# Patient Record
Sex: FEMALE | Race: WHITE
[De-identification: names, ages, dates, MRNs, and addresses within clinical notes are randomized per-mention and may not be internally consistent; named-entity substitution may affect disease eponyms.]

---

## 2017-01-06 ENCOUNTER — HOSPITAL ENCOUNTER (INPATIENT)
Dept: HOSPITAL 45 - C.OPB | Age: 32
LOS: 1 days | Discharge: HOME | End: 2017-01-07
Attending: OBSTETRICS & GYNECOLOGY | Admitting: OBSTETRICS & GYNECOLOGY
Payer: COMMERCIAL

## 2017-01-06 VITALS
WEIGHT: 150.36 LBS | BODY MASS INDEX: 25.05 KG/M2 | HEIGHT: 65 IN | BODY MASS INDEX: 25.05 KG/M2 | HEIGHT: 65 IN | WEIGHT: 150.36 LBS

## 2017-01-06 VITALS — DIASTOLIC BLOOD PRESSURE: 87 MMHG | TEMPERATURE: 98.06 F | HEART RATE: 103 BPM | SYSTOLIC BLOOD PRESSURE: 130 MMHG

## 2017-01-06 VITALS — SYSTOLIC BLOOD PRESSURE: 102 MMHG | HEART RATE: 56 BPM | DIASTOLIC BLOOD PRESSURE: 60 MMHG | TEMPERATURE: 98.24 F

## 2017-01-06 VITALS — HEART RATE: 88 BPM | TEMPERATURE: 98.06 F | DIASTOLIC BLOOD PRESSURE: 74 MMHG | SYSTOLIC BLOOD PRESSURE: 115 MMHG

## 2017-01-06 DIAGNOSIS — Z3A.39: ICD-10-CM

## 2017-01-06 LAB
EOSINOPHIL NFR BLD AUTO: 212 K/UL (ref 130–400)
HCT VFR BLD CALC: 36.7 % (ref 37–47)
MCH RBC QN AUTO: 29.8 PG (ref 25–34)
MCHC RBC AUTO-ENTMCNC: 33.8 G/DL (ref 32–36)
MCV RBC AUTO: 88.2 FL (ref 80–100)
PMV BLD AUTO: 10.2 FL (ref 7.4–10.4)
RBC # BLD AUTO: 4.16 M/UL (ref 4.2–5.4)
WBC # BLD AUTO: 10.1 K/UL (ref 4.8–10.8)

## 2017-01-06 PROCEDURE — 0KQM3ZZ REPAIR PERINEUM MUSCLE, PERCUTANEOUS APPROACH: ICD-10-PCS | Performed by: OBSTETRICS & GYNECOLOGY

## 2017-01-06 RX ADMIN — Medication PRN MG: at 09:55

## 2017-01-06 RX ADMIN — Medication PRN MG: at 18:21

## 2017-01-06 RX ADMIN — DOCUSATE SODIUM SCH MG: 100 CAPSULE, LIQUID FILLED ORAL at 19:35

## 2017-01-06 NOTE — DELIVERY SUMMARY
DATE OF OPERATION:  01/06/2017

 

TIME OF DELIVERY OF BABY:  0758.

 

TIME OF DELIVERY OF PLACENTA:  0821.

 

DETAILS OF DELIVERY:  The patient was found to be fully dilated and desired

to push.  She pushed for about 10 minutes and delivered the head without

difficulty.  Shoulders were delivered with minimum traction.  Baby was handed

off to the mother where mouth and nose were suctioned.  Cord was clamped x2

and cut, it was a 3-vessel cord, and then cord blood was obtained.  Perineum

and vagina were checked for lacerations.  There was a perineal laceration in

the posterior fourchette and rectal exam was done and confirmed to be a third

degree. Good sphincter tone was noted.

The gloves were changed.

then external fibers of the sphincter were held with

Allis clamps and then these fibers were reapproximated with

2-0 Vicryl with figure-of-eight stitch in end-to-end fashion. Then rectal

exam was repeated and no sutures were felt and good sphincter tone noted.  
Gloves

were changed.  Vaginal mucosa and perineal muscles were reapproximated with a

different 2-0 Vicryl in a running locked fashion, skin in a subcuticular

fashion.  Good hemostasis was achieved.  The rest of the vagina and perineum

were intact.  Then, placenta was found to be in the vagina, delivered

spontaneously, intact and complete.  Uterus was explored, found to be empty. 

Lower segment cleared off all clots and debris.  Fundus was firm.  EBL was

300.  Baby and mother tolerated the procedure well.  Sponge, lap, needle and

instrument counts were correct x2.  Baby was a viable female infant.  Apgars

were 9/9.  Weight is pending.  The patient was given 600 mg of clindamycin

during third-degree repair.  No complications happened and I was present

during the whole procedure.

 

 

I attest to the content of the Intraoperative Record and any orders documented 
therein. Any exceptions are noted below.

 

 

 

MTDD

## 2017-01-07 VITALS
OXYGEN SATURATION: 99 % | DIASTOLIC BLOOD PRESSURE: 66 MMHG | TEMPERATURE: 98.6 F | SYSTOLIC BLOOD PRESSURE: 103 MMHG | HEART RATE: 97 BPM

## 2017-01-07 VITALS — DIASTOLIC BLOOD PRESSURE: 81 MMHG | SYSTOLIC BLOOD PRESSURE: 118 MMHG | HEART RATE: 84 BPM | TEMPERATURE: 98.42 F

## 2017-01-07 VITALS — HEART RATE: 80 BPM | TEMPERATURE: 98.6 F | DIASTOLIC BLOOD PRESSURE: 72 MMHG | SYSTOLIC BLOOD PRESSURE: 107 MMHG

## 2017-01-07 VITALS
SYSTOLIC BLOOD PRESSURE: 117 MMHG | HEART RATE: 90 BPM | DIASTOLIC BLOOD PRESSURE: 76 MMHG | TEMPERATURE: 98.42 F | OXYGEN SATURATION: 97 %

## 2017-01-07 VITALS — OXYGEN SATURATION: 99 %

## 2017-01-07 VITALS — DIASTOLIC BLOOD PRESSURE: 81 MMHG | TEMPERATURE: 98.42 F | HEART RATE: 84 BPM

## 2017-01-07 LAB — HCT VFR BLD CALC: 32 % (ref 37–47)

## 2017-01-07 RX ADMIN — Medication PRN MG: at 15:29

## 2017-01-07 RX ADMIN — DOCUSATE SODIUM SCH MG: 100 CAPSULE, LIQUID FILLED ORAL at 07:29

## 2017-01-07 RX ADMIN — Medication PRN MG: at 07:30

## 2017-01-07 NOTE — OB/GYN PROGRESS NOTE
OB/GYN Progress Note


Date of Service


Jan 7, 2017.





Subjective


conversation w/ patient, physical exam


Ambulation:  ambulating normally


Voiding:  no voiding problems


Passing Gas:  Yes


Diet Tolerance:  Regular Diet


Lochia:  Moderate


Feeding Type:  Breast Feeding





Review of Systems


Constitutional:  No chills, No fatigue, No fever, No problem reported, No sweats

, No weakness, No weight loss


Respiratory:  No cough, No dyspnea at rest, No dyspnea on exertion, No 

hemoptysis, No problem reported, No shortness of breath, No sputum, No wheezing


Cardiac:  No PND, No chest pain, No claudication, No edema, No orthopnea, No 

palpitations, No problem reported


Breast:  No breast lump, No breast pain, No change in shape, No nipple discharge

, No problem reported, No see HPI


Abdomen:  No GI bleeding, No constipation, No diarrhea, No nausea, No pain, No 

problem reported, No vomiting


Female :  No abnormal vaginal bleeding, No dysuria, No hematuria, No 

incontinence, No problem reported, No see HPI, No urinary frequency, No vaginal 

discharge


Vd Day #1


pt doing well


no complaints


anticipate disch tomorrow


paperwork done for disch tomorrow





Objective


Vital Signs











  Date Time  Temp Pulse Resp B/P Pulse Ox O2 Delivery O2 Flow Rate FiO2


 


1/7/17 07:30 37.0 80 20 107/72  Room Air  


 


1/7/17 07:20      Room Air  


 


1/7/17 04:30 37.0 97 18 103/66 99 Room Air  


 


1/7/17 00:10     97 Room Air  


 


1/7/17 00:10 36.9 90 18 117/76 97 Room Air  


 


1/6/17 20:01 36.7 88 18 115/74  Room Air  


 


1/6/17 15:15 36.8 56 16 102/60  Room Air  


 


1/6/17 15:15      Room Air  


 


1/6/17 11:50 36.7 103 18 130/87  Room Air  


 


1/6/17 11:50      Room Air  











Laboratory Results





Last 24 Hours








Test


  1/7/17


05:57


 


Hemoglobin 10.6 g/dL 


 


Hematocrit 32.0 %

## 2017-01-07 NOTE — DISCHARGE INSTRUCTIONS
Discharge Instructions


Admission


Reason for Admission:  LABOR





Discharge


Discharge Diagnosis / Problem:  postpartum





Discharge Goals


Goal(s):  Routine recovery after delivery





Activity Recommendations


Activity Limitations:  as noted below


Lifting Limitations:  gradually increase as tolerated


Exercise/Sports Limitations:  until after follow-up appointment


May Resume Sexual Activity:  after follow-up appointment


Driving or Machine Use:  





ACTIVITY RECOMMENDATIONS:





* Gradual return to full activity over the next 2-3 weeks.


* No lifting - nothing heavier than baby over the next 2-3 weeks.


* Do not engage in vigorous exercise, sexual activity or sports until cleared 

by 


   your physician.


* Do not drive or operate any motorized equipment until cleared by your 

physician.


* You may shower/bathe daily.








BREAST CARE:





If you are not breast feeding:





*  Wear a supportive bra 24 hours a day for one to two weeks.


*  Avoid stimulating your breasts and nipples as much as possible during the


    first few weeks after delivery.


*  When taking a shower, have the warm water hit your back, not breasts.


*  When your breasts feel full, apply ice packs.  Usually three to four times 


    a day helps ease the discomfort.


*  Take a mild pain medication (Tylenol/Motrin) when you are uncomfortable.





If breast feeding:





*  Use breast milk to lubricate nipples.  Lansinoh cream may be used for sore 

nipples. 


    You do not need to remove cream prior to breast feeding.  If using a 

different 


    brand of cream, check the label for directions regarding removal of cream 

prior 


    to nursing.


*  Wear a supportive bra.


*  If having problems with breasts or breast feeding, call a lactation 

consultant or 


    your health care provider.








EPISIOTOMY CARE:





After delivery, if you have an episiotomy (stitches), the following steps will 


ease discomfort and aid healing.





*   For the first 24 hours after delivery, place ice packs next to your 

episiotomy 


    to help reduce swelling.


*  After the first 24 hour-period, sitz baths, either portable or in the tub, 

are 


    suggested.  A shower with a shower arm sprayed over the episiotomy may 


    be comforting.


*  Elvi care should be done after each voiding and bowel movement.  Squirt 


    warm water from a plastic bottle over the perineum (region of the body 


    between the anus and urinary opening) and pat dry.


*  Use Dermoplast to ease discomfort.  Shake container.  Spray directly over


    the episiotomy.  


*  Place a Tucks on a clean sanitary pad next to your episiotomy.








OVER THE COUNTER MEDICATION:





*  For discomfort or pain, you may use Acetaminophen (Tylenol), Ibuprofen (Advil

), or 


    Naproxen (Aleve) following the package directions. 


*  For constipation you may use Colace following the package directions.








SPECIAL CARE INSTRUCTIONS:





When you are discharged from the hospital, it is important for you to follow 


the instructions listed below:





*  During the first week at home, you should be able to care for yourself and


    your baby.  In addition, the usual light household activities are 

encouraged.





*  Limit your activities to the way you feel.  Do not try to clean the house or 


    move furniture.  Be sensible.





*  If you actively engage in sports and have done so up until the time of your


   delivery, you may resume these activities as soon as you feel able.  This may


   take up to one month or even longer.  Use good judgment.





*  Continue to take your prenatal vitamins for at least six weeks after the 

birth


    of your baby.





*  Your diet need not be limited unless you were on a special diet before your 


    delivery.  Breast-feeding mothers need around 2500 calories per day and at


    least 64-80 ounces of fluid per day (8 to 10 glasses).





*  You should eat foods from the four major food groups.  Crash diets or fad 

diets


    are to be avoided.  Eating lean meats, fresh fruits and vegetables, low-fat 


    dairy products, high fiber foods and a regular exercise program, will help 

you 


    get back to your pre-pregnancy weight without putting your health at risk.





*  Constipation is sometimes a problem after delivery.  Take a mild laxative as 


    needed.  If breast feeding, Milk of Magnesia is acceptable to use. You may 


    use a suppository or Fleets enema if no episiotomy.





*  A daily shower or tub bath is suggested.  Be sure to thoroughly and gently 


    dry the perineum.





*  A bloody vaginal discharge will usually continue until around four weeks post


    partum.  A small amount of bleeding may continue for as long as six weeks.  


    Vaginal discharge changes from the bright red bleeding after delivery to 

pink 


    then brownish and finally yellowish-pink before becoming white and 

disappearing.





*  Bleeding may increase with activity.  Your first period may come in 4-8 

weeks.


    If you are breast feeding, your period may be delayed even longer.





*  [place] (sex) can begin whenever both you and your partner feel 

comfortable


    and do not have any form of genital infection.  It is recommended that you 

wait


    until after your return postpartum appointment and discuss with your 

physician.


    If you have questions, please talk to your health care practitioner.  A 

condom 


    should be used to prevent infection and pregnancy.





*  Foreplay, gentle intercourse and lubrication is very important the first 

several 


    times to prevent pain.  A water-based lubricant such as K-Y jelly or 

Astroglide 


    may be used.





*  Tampons may be used six weeks after delivery.





*  Douching should be avoided for 6 weeks after delivery.





*  If you have RH negative blood and your baby is RH positive, you will receive 


    RHOGAM by injection prior to discharge.  The nurse will give you a card to 


    keep with you that has the date and place that you received RHOGAM after 


    delivery.





*  During your prenatal care, you had a Rubella screen done to check for the 


    presence of rubella antibodies in your blood.  If your test was negative, 

you


    will receive a Rubella vaccine prior to discharge.  This vaccine may cause 

a 


    fever, soreness at the injection site and flu-like symptoms.  If these 

symptoms


    persist, notify your health care practitioner.   Pregnancy is not advised 

for 


    three months after a Rubella vaccine.  There is a higher chance of having a 


    baby with birth defects if conceived within three months of getting the 

vaccine.





*  If you were discharged 24 hours from delivery or before 48 hours: Visiting 

nurses 


    will come to your home 48 hours after discharge to assess you and your 

baby.  


    The visiting nurse will meet with you while you are in the hospital to 

arrange a 


    time and get directions to your home.





*  Verbalizes understanding of car seat law as reviewed with patient nursing.





*  Car Seat hand-out given and reviewed with patient by nursing.





*  Shaken baby information reviewed with patient by nursing.


 





Call you doctor if:





*  Heavy bleeding (saturating several pads an hour) or passing clots the size 

of 


    your fist.


*  A fever >101 degrees F (38.3 degrees C) on two occasions four hours apart 


    and/or chills.


*  Unusual pain in the pelvic or vaginal areas.


*  "Baby Blues" lasting longer than two weeks.





If you have any questions or concerns, call your health care practitioner 


at [phone].








FOLLOW-UP VISIT:





*  Please call the office at [phone] to schedule a 6 week postpartum


    examination.  It is important you keep this appointment.  


*  It is important for you to make arrangements for either yearly or twice 


    yearly check-ups thereafter.











.





Current Hospital Diet


Patient's current hospital diet: Regular OB Diet





Discharge Diet


Recommended Diet:  Regular Diet





Pending Studies


Studies pending at discharge:  no





Medical Emergencies








.


Who to Call and When:





Medical Emergencies:  If at any time you feel your situation is an emergency, 

please call 911 immediately.





.





Non-Emergent Contact


Non-Emergency issues call your:  Specialist


Call Non-Emergent contact if:  you have a fever, your pain is not controlled, 

wound has increased drainage





.


.





"Provider Documentation" section prepared by Richard Emanuel.





VTE Core Measure


Inpt VTE Proph given/why not?:  Treatment not indicated

## 2017-04-18 ENCOUNTER — HOSPITAL ENCOUNTER (EMERGENCY)
Dept: HOSPITAL 45 - C.EDB | Age: 32
LOS: 1 days | Discharge: HOME | End: 2017-04-19
Payer: COMMERCIAL

## 2017-04-18 VITALS
WEIGHT: 129.85 LBS | WEIGHT: 129.85 LBS | HEIGHT: 65 IN | HEIGHT: 65 IN | BODY MASS INDEX: 21.63 KG/M2 | BODY MASS INDEX: 21.63 KG/M2

## 2017-04-18 VITALS — TEMPERATURE: 97.88 F

## 2017-04-18 DIAGNOSIS — N13.30: ICD-10-CM

## 2017-04-18 DIAGNOSIS — M54.5: ICD-10-CM

## 2017-04-18 DIAGNOSIS — R10.2: Primary | ICD-10-CM

## 2017-04-18 LAB
APPEARANCE UR: CLEAR
BILIRUB UR-MCNC: (no result) MG/DL
COLOR UR: YELLOW
EOSINOPHIL NFR BLD AUTO: 229 K/UL (ref 130–400)
HCT VFR BLD CALC: 41.1 % (ref 37–47)
MCH RBC QN AUTO: 29 PG (ref 25–34)
MCHC RBC AUTO-ENTMCNC: 33.3 G/DL (ref 32–36)
MCV RBC AUTO: 86.9 FL (ref 80–100)
NITRITE UR QL STRIP: (no result)
PH UR STRIP: 6.5 [PH] (ref 4.5–7.5)
PMV BLD AUTO: 9.6 FL (ref 7.4–10.4)
RBC # BLD AUTO: 4.73 M/UL (ref 4.2–5.4)
SP GR UR STRIP: 1.01 (ref 1–1.03)
URINE BILL WITH OR WITHOUT MIC: (no result)
URINE EPITHELIAL CELL AUTO: (no result) /LPF (ref 0–5)
UROBILINOGEN UR-MCNC: (no result) MG/DL
WBC # BLD AUTO: 6.13 K/UL (ref 4.8–10.8)

## 2017-04-19 VITALS — HEART RATE: 73 BPM | SYSTOLIC BLOOD PRESSURE: 119 MMHG | OXYGEN SATURATION: 98 % | DIASTOLIC BLOOD PRESSURE: 87 MMHG

## 2017-04-19 LAB
ALP SERPL-CCNC: 122 U/L (ref 45–117)
ALT SERPL-CCNC: 35 U/L (ref 12–78)
ANION GAP SERPL CALC-SCNC: 6 MMOL/L (ref 3–11)
AST SERPL-CCNC: 19 U/L (ref 15–37)
BASOPHILS # BLD: 0.03 K/UL (ref 0–0.2)
BASOPHILS NFR BLD: 0.5 %
BUN SERPL-MCNC: 17 MG/DL (ref 7–18)
BUN/CREAT SERPL: 22 (ref 10–20)
CALCIUM SERPL-MCNC: 8.9 MG/DL (ref 8.5–10.1)
CHLORIDE SERPL-SCNC: 107 MMOL/L (ref 98–107)
CO2 SERPL-SCNC: 30 MMOL/L (ref 21–32)
COMPLETE: YES
CREAT CL PREDICTED SERPL C-G-VRATE: 95.8 ML/MIN
CREAT SERPL-MCNC: 0.78 MG/DL (ref 0.6–1.2)
GLUCOSE SERPL-MCNC: 94 MG/DL (ref 70–99)
IG%: 0.2 %
IMM GRANULOCYTES NFR BLD AUTO: 52.4 %
LYMPHOCYTES # BLD: 3.21 K/UL (ref 1.2–3.4)
MANUAL MICROSCOPIC REQUIRED?: NO
MONOCYTES NFR BLD: 8.2 %
NEUTROPHILS # BLD AUTO: 5.1 %
NEUTROPHILS NFR BLD AUTO: 33.6 %
POTASSIUM SERPL-SCNC: 3.7 MMOL/L (ref 3.5–5.1)
PREG INTERNAL NEGATIVE QC: (no result)
PREG INTERNAL POSITIVE QC: (no result)
REVIEW REQ?: NO
SODIUM SERPL-SCNC: 143 MMOL/L (ref 136–145)

## 2017-04-19 NOTE — DIAGNOSTIC IMAGING REPORT
RENAL ULTRASOUND



HISTORY:      pelvic, back pain, pain with urination



COMPARISON:  None.



FINDINGS:



Right kidney: 11.1 . Mild fullness within the right renal collecting system

without cyril hydronephrosis. Normal corticomedullary differentiation and

cortical thickness.



Left kidney: 11.2 No hydronephrosis. Normal corticomedullary differentiation and

cortical thickness.



Bladder: No bladder wall thickening. The bilateral ureteral jets were

identified.



Liver is borderline enlarged measuring 18.6 cm in length.



IMPRESSION:  



1. Mild fullness within the right renal collecting system without cyril

hydronephrosis.

2. Normal left kidney. 







Electronically signed by:  Daniel Jeffrey M.D.

4/19/2017 7:44 AM



Dictated Date/Time:  4/19/2017 7:41 AM

## 2017-04-19 NOTE — DIAGNOSTIC IMAGING REPORT
EXAMINATION: PELVIC ULTRASOUND (transabdominal and endovaginal scanning)



CLINICAL HISTORY: Pelvic pain, nausea, vomiting, diarrhea.    



COMPARISON STUDY:  June 2008



FINDINGS: 

The uterus measured 7.7 x 3 x 5 cm..

The endometrial stripe measured 3 mm.

The right ovary measured 34 x 17 x 17 mm.

The left ovary measured 32 x 15 x 19 mm.

There is no ultrasonographic evidence of ovarian torsion. It should be noted

that ovarian torsion can be present with normal Doppler ultrasonographic

findings.

There is trace free fluid, likely physiologic.



IMPRESSION:  Minimal free fluid likely physiologic. Otherwise unremarkable

pelvic ultrasound.







Electronically signed by:  Rasheed Quiroz M.D.

4/19/2017 6:43 AM



Dictated Date/Time:  4/19/2017 6:41 AM

## 2017-04-19 NOTE — EMERGENCY ROOM VISIT NOTE
ED Visit Note


First contact with patient:  23:05


Chief Complaint: Abdominal pain.





History of Present Illness: Ms. Bhat is a 31 year-old white female who 

ambulates into the ED accompanied by her baby child complaining of lower 

abdominal pain, pain with urination, lower back pain with urination, fatigue 

and intermittent nausea. 


Historically patient reports she is  4 para 2 and postpartum 3 months.


Patient reports a gradual onset of lower abdominal pain that started 

approximately one month ago.  Since that time her pain has been constant but 

waxing and waning in intensity.  This was associated with a full urination, 

lower back pain.  She describes her abdominal pain as a crampy sensation in her 

back pain as an achy sensation.  She rates her current discomfort 3/10.  Her 

pain worsens with urination.  She has not identified any other aggravating 

factors.  She has been intermittently using Tylenol with mild relief of her 

discomfort.  Associated she has noted urinary burning and increased urinary 

frequency.  Additionally she reports that she has been having intermittent 

sensations of chills and nausea but has not had no cyril fevers or vomiting.  

Additionally she does report that when her discomfort escalates she does get 

lightheaded but has not had a syncopal episode.


She does report approximately one week ago she was seen by her OB/GYN physician 

who did a pelvic examination and reported everything as "normal", but 

encouraged the patient to call back in a week's time if the symptoms have not 

resolved for possible ultrasound of her pelvis and kidneys.  


Additionally she reports she has been feeling fatigued but does report she has 

been taking care of 3 children.


Patient denies fevers, chills, sweats, skin eruptions, skin color changes, 

upper respiratory tract symptoms, shortness of breath, chest pain, diarrhea, 

constipation, rectal bleeding, black/tarry stools, hematuria, vaginal bleeding, 

vaginal discharge.





Review of Systems: As noted above in history of present illness. All body 

systems were reviewed and found to be negative as noted above.





Past Medical History: Ovarian cyst rupture, unspecified bony fracture and 

unspecified skin disorder.


Current Medications: Ibuprofen, prenatal vitamins.


Allergies to Medications: Bactrim.


Social History: Patient is not employed; she feels safe in her home environment

; she denies tobacco and alcohol use.





Physical Examination:


Vital Signs: 








  Date Time  Temp Pulse Resp B/P Pulse Ox O2 Delivery O2 Flow Rate FiO2


 


17 22:30 36.6 73 19 125/84 99   





GENERAL: 31-year-old female in mild distress due to pain, nontoxic-appearing, 

afebrile and hemodynamically stable.


NEUROLOGICAL: Awake, alert and oriented to person, place and time.  Answering 

questions appropriately and following commands.  Normal gait.  Good hand eye 

coordination.  


SKIN: Warm, dry and pink.  No soft tissue eruptions or trauma noted.


HEENT:  Atraumatic and normocephalic.  PERRLA.  Sclera white and conjunctiva 

pink.  Oral cavity moist and pink.  Pharynx is nonerythematous or edematous.  

Speech normal.  No lymphadenopathy.  Trachea midline.  No jugular venous 

distention.


BACK:  No tenderness over the bony spine.  No tenderness throughout the 

paraspinous muscles.  Full range of motion at the waist.  No CVA tenderness.  


THORAX:  Lungs sounds are clear to auscultation and equal bilaterally with 

symmetrical chest wall.  No wheezing, rales or rhonchi.  No crepitus, tenderness

, subcutaneous air or deformities noted.


HEART:  Regular rate and rhythm.  No gallops, rubs or murmurs are appreciated.


ABDOMEN: Flat and soft with mild tenderness throughout the lower abdomen.  

Positive bowel sounds in all quadrants.  No guarding, rigidity or organomegaly.


EXTREMITIES:  Moves all extremities well on command and with purpose.  All 

distal neurovascular statuses are intact and equal bilaterally.  





ED Course:


Patient is assessed as noted above.


Laboratory Testing:








Test


  17


22:40 17


23:40 Range/Units


 


 


Urine Color YELLOW   


 


Urine Appearance CLEAR  CLEAR  


 


Urine pH 6.5  4.5-7.5  


 


Urine Specific Gravity 1.007  1.000-1.030  


 


Urine Protein NEG  NEG  


 


Urine Glucose (UA) NEG  NEG  


 


Urine Ketones NEG  NEG  


 


Urine Occult Blood NEG  NEG  


 


Urine Nitrite NEG  NEG  


 


Urine Bilirubin NEG  NEG  


 


Urine Urobilinogen NEG  NEG  


 


Urine Leukocyte Esterase TRACE  NEG  


 


Urine WBC (Auto) 1-5  0-5  /hpf


 


Urine RBC (Auto) 0-4  0-4  /hpf


 


Urine Hyaline Casts (Auto) 0  0-5  /lpf


 


Urine Epithelial Cells (Auto) 20-30  0-5  /lpf


 


Urine Bacteria (Auto) NEG  NEG  


 


White Blood Count  6.13 4.8-10.8  K/uL


 


Red Blood Count  4.73 4.2-5.4  M/uL


 


Hemoglobin  13.7 12.0-16.0  g/dL


 


Hematocrit  41.1 37-47  %


 


Mean Corpuscular Volume  86.9   fL


 


Mean Corpuscular Hemoglobin  29.0 25-34  pg


 


Mean Corpuscular Hemoglobin


Concent 


  33.3


  32-36  g/dl


 


 


Platelet Count  229 130-400  K/uL


 


Mean Platelet Volume  9.6 7.4-10.4  fL


 


Neutrophils (%) (Auto)  33.6  %


 


Lymphocytes (%) (Auto)  52.4  %


 


Monocytes (%) (Auto)  8.2  %


 


Eosinophils (%) (Auto)  5.1  %


 


Basophils (%) (Auto)  0.5  %


 


Neutrophils # (Auto)  2.07 1.4-6.5  K/uL


 


Lymphocytes # (Auto)  3.21 1.2-3.4  K/uL


 


Monocytes # (Auto)  0.50 0.11-0.59  K/uL


 


Eosinophils # (Auto)  0.31 0-0.5  K/uL


 


Basophils # (Auto)  0.03 0-0.2  K/uL


 


RDW Standard Deviation  43.3 36.4-46.3  fL


 


RDW Coefficient of Variation  13.6 11.5-14.5  %


 


Immature Granulocyte % (Auto)  0.2  %


 


Immature Granulocyte # (Auto)  0.01 0.00-0.02  K/uL


 


Red Blood Cell Morphology  Unremarkable  


 


Sodium Level  143 136-145  mmol/L


 


Potassium Level  3.7 3.5-5.1  mmol/L


 


Chloride Level  107   mmol/L


 


Carbon Dioxide Level  30 21-32  mmol/L


 


Anion Gap  6.0 3-11  mmol/L


 


Blood Urea Nitrogen  17 7-18  mg/dl


 


Creatinine


  


  0.78


  0.60-1.20


mg/dl


 


Est Creatinine Clear Calc


Drug Dose 


  95.8


   ml/min


 


 


Estimated GFR (


American) 


  119.1


   


 


 


Estimated GFR (Non-


American 


  102.7


   


 


 


BUN/Creatinine Ratio  22.0 10-20  


 


Random Glucose  94 70-99  mg/dl


 


Calcium Level  8.9 8.5-10.1  mg/dl


 


Total Bilirubin  0.2 0.2-1  mg/dl


 


Direct Bilirubin  < 0.1 0-0.2  mg/dl


 


Aspartate Amino Transf


(AST/SGOT) 


  19


  15-37  U/L


 


 


Alanine Aminotransferase


(ALT/SGPT) 


  35


  12-78  U/L


 


 


Alkaline Phosphatase  122   U/L


 


Total Protein  8.2 6.4-8.2  gm/dl


 


Albumin  3.9 3.4-5.0  gm/dl


 


Lipase  215   U/L


 


Human Chorionic Gonadotropin,


Qual 


  NEG


  NEG  


 





Pelvic Ultrasound: Was reviewed by myself and read by the radiologist and 

showing no endometrial thickening, normal ovarian follicles, suspected 

nabothian cyst, blood flow seen to the bilateral ovaries, nonspecific focus of 

increased echogenicity with some increased vascularity in the right ovary less 

than 1 cm in size and a small amount of pelvic fluid.


Renal Ultrasound: Was reviewed by myself and read by the radiologist showing 

suspected mild right hydronephrosis with ectatic appearing collection system 

and renal pelvis.  Bilateral urethral jets.  Normal appearing left kidney.  

Accidental imaging of the liver shows that it is generous in size measuring 

approximately 18.6 cm.


Patient was hydrated with normal saline; patient was offered pain medications 

and refused.


Patient was reassessed multiple times during her stay in the emergency 

department.


Patient's case was reviewed with Dr. George; we agreed on diagnostic 

approach, treatment, disposition and plan.


Patient was educated about tonight's findings and instructed on her treatment 

plan; she verbalized understanding and agreement with this plan.





Clinical Impression: Pelvic pain.  Lower back pain.  ENT.  Mild hydronephrosis.





Decision-Making: Initially my differential diagnosis I considered pelvic 

inflammatory disease, pyelonephritis, musculoskeletal disorder, dehydration, 

and other causes.





Disposition: Patient discharged home in stable condition accompanied by her 

; prior to departure she was reassessed and subjectively reported she 

was feeling the same.





Plan:


Patient was encouraged to use acetaminophen as needed for pain every 6 hours.


Patient was encouraged to stay well-hydrated.


Patient was encouraged to follow-up with her OB/GYN physician.


Patient was encouraged return the ED for worsening symptoms, fevers, or any new/

concerning symptoms.

## 2017-04-19 NOTE — DIAGNOSTIC IMAGING REPORT
RENAL ULTRASOUND



HISTORY:      pelvic, back pain, pain with urination



COMPARISON:  None.



FINDINGS:



Right kidney: 11.1 . Mild fullness within the right renal collecting system

without cyril hydronephrosis. Normal corticomedullary differentiation and

cortical thickness.



Left kidney: 11.2 No hydronephrosis. Normal corticomedullary differentiation and

cortical thickness.



Bladder: No bladder wall thickening. The bilateral ureteral jets were

identified.



Liver is borderline enlarged measuring 18.6 cm in length.



IMPRESSION:  



1. Mild fullness within the right renal collecting system without cyril

hydronephrosis.

2. Normal left kidney. 







Electronically signed by:  Daniel eJffrey M.D.

4/19/2017 7:44 AM



Dictated Date/Time:  4/19/2017 7:41 AM

## 2018-01-26 ENCOUNTER — HOSPITAL ENCOUNTER (EMERGENCY)
Dept: HOSPITAL 45 - C.EDB | Age: 33
Discharge: HOME | End: 2018-01-26
Payer: COMMERCIAL

## 2018-01-26 VITALS — HEART RATE: 83 BPM | SYSTOLIC BLOOD PRESSURE: 110 MMHG | OXYGEN SATURATION: 100 % | DIASTOLIC BLOOD PRESSURE: 62 MMHG

## 2018-01-26 VITALS
BODY MASS INDEX: 21.97 KG/M2 | BODY MASS INDEX: 21.97 KG/M2 | HEIGHT: 65 IN | HEIGHT: 65 IN | WEIGHT: 131.84 LBS | WEIGHT: 131.84 LBS

## 2018-01-26 VITALS — TEMPERATURE: 97.7 F

## 2018-01-26 DIAGNOSIS — N83.201: Primary | ICD-10-CM

## 2018-01-26 LAB
ALBUMIN SERPL-MCNC: 4.1 GM/DL (ref 3.4–5)
ALP SERPL-CCNC: 115 U/L (ref 45–117)
ALT SERPL-CCNC: 22 U/L (ref 12–78)
AST SERPL-CCNC: 14 U/L (ref 15–37)
BASOPHILS # BLD: 0.03 K/UL (ref 0–0.2)
BASOPHILS NFR BLD: 0.4 %
BUN SERPL-MCNC: 11 MG/DL (ref 7–18)
CALCIUM SERPL-MCNC: 8.9 MG/DL (ref 8.5–10.1)
CO2 SERPL-SCNC: 28 MMOL/L (ref 21–32)
CREAT SERPL-MCNC: 0.64 MG/DL (ref 0.6–1.2)
EOS ABS #: 0.3 K/UL (ref 0–0.5)
EOSINOPHIL NFR BLD AUTO: 245 K/UL (ref 130–400)
GLUCOSE SERPL-MCNC: 78 MG/DL (ref 70–99)
HCT VFR BLD CALC: 43.1 % (ref 37–47)
HGB BLD-MCNC: 14.5 G/DL (ref 12–16)
IG#: 0.01 K/UL (ref 0–0.02)
IMM GRANULOCYTES NFR BLD AUTO: 40.9 %
LYMPHOCYTES # BLD: 2.77 K/UL (ref 1.2–3.4)
MCH RBC QN AUTO: 30.4 PG (ref 25–34)
MCHC RBC AUTO-ENTMCNC: 33.6 G/DL (ref 32–36)
MCV RBC AUTO: 90.4 FL (ref 80–100)
MONO ABS #: 0.75 K/UL (ref 0.11–0.59)
MONOCYTES NFR BLD: 11.1 %
NEUT ABS #: 2.91 K/UL (ref 1.4–6.5)
NEUTROPHILS # BLD AUTO: 4.4 %
NEUTROPHILS NFR BLD AUTO: 43.1 %
PMV BLD AUTO: 10 FL (ref 7.4–10.4)
POTASSIUM SERPL-SCNC: 3.8 MMOL/L (ref 3.5–5.1)
PROT SERPL-MCNC: 8.5 GM/DL (ref 6.4–8.2)
RED CELL DISTRIBUTION WIDTH CV: 12.9 % (ref 11.5–14.5)
RED CELL DISTRIBUTION WIDTH SD: 42.5 FL (ref 36.4–46.3)
SODIUM SERPL-SCNC: 136 MMOL/L (ref 136–145)
WBC # BLD AUTO: 6.77 K/UL (ref 4.8–10.8)

## 2018-01-26 NOTE — EMERGENCY ROOM VISIT NOTE
History


First contact with patient:  13:02


Chief Complaint:  ABDOMINAL PAIN


Stated Complaint:  RIGHT LOWER ABD PAIN/BACK PAIN


Nursing Triage Summary:  


having frequent urination. lower back pain some pelvic pain. went to urgent 

care 


no uti not pregnant. symptoms have been getting worse for the past week.





History of Present Illness


The patient is a 32 year old female who presents to the Emergency Room with 

complaints of lower abdominal pain radiating to her back has been fairly 

constant for the last week.  She describes it as a dull, achy sensation.  She 

has also had urinary frequency.  She denies any dysuria, fever or chills.  No 

nausea or vomiting.  Bowel movements have been regular.  The patient was first 

seen at urgent care, and sent here for further evaluation.  A pregnancy test 

and a urinalysis were performed.  No abnormalities were noted.  She denies any 

vaginal discharge.  She is sexually active with one partner.





Review of Systems


10 system review performed and  negative unless noted in HPI or below





Past Medical/Surgical History


Medical Problems:


(1) Amniotic fluid leaking








Family History


Gallbladder disease





Social History


Smoking Status:  Never Smoker


Marital Status:  


Housing Status:  lives with family


Occupation Status:  employed





Current/Historical Medications


Scheduled


Multivitamin (Multivitamin), 1 TAB PO DAILY





Physical Exam


Vital Signs











  Date Time  Temp Pulse Resp B/P (MAP) Pulse Ox O2 Delivery O2 Flow Rate FiO2


 


1/26/18 16:19  83 18 110/62 100   


 


1/26/18 14:18  67 18 101/70 100 Room Air  


 


1/26/18 12:25 36.5 64 18 106/78 100 Room Air  











Physical Exam


VITALS: Vitals are noted on the nurse's note and reviewed by myself.  Vital 

signs stable.


GENERAL: 32-year-old female, in no acute distress, nondiaphoretic, well-

developed well-nourished.


SKIN: The skin was without rashes, erythema, edema, or bruising.  


HEAD: Normocephalic atraumatic.  


MOUTH: Mucous membranes moist. 


NECK: Supple without nuchal rigidity.  No JVD.


HEART: Regular rate and rhythm without murmurs gallops or rubs.


LUNGS: Clear to auscultation bilaterally without wheezes, rales or rhonchi.   

No accessory muscle use.


ABDOMEN: Positive bowel sounds x 4.Soft, tenderness to palpation particularly 

in the right lower quadrant.  Slight right-sided CVA tenderness noted.  No 

guarding or rebound tenderness.


MUSCULOSKELETAL: No muscle atrophy, erythema, or edema noted.  Strength 5/5 

throughout.


NEURO: Patient was alert and oriented to person place and time.  Normal 

sensation to touch. No focal neurological deficits.





Medical Decision & Procedures


ER Provider


Diagnostic Interpretation:


Renal US





                                                                               

                                                                 


Patient Name: RAZA BOND                               Unit Number:  

[id number]                  


 








 











Dictated: 01/26/18 1516


 


Transcribed: 01/26/18 1516


 


JRB


 


Printed Date/Time: [~ rep prt dt]/[~ rep prt tm]








 [~ rep ct labl] - [~ rep ct ivnm]


 





   [facility]


 Radiology Department


 [place] [address] [phone]





 











Dictated: 01/26/18 1516


 


Transcribed: 01/26/18 1516


 


JRB


 


Printed Date/Time: [~ rep prt dt]/[~ rep prt tm]








 [~ rep ct labl] - [~ rep ct ivnm]


 








 IMPRESSION: 


1. Hyperechoic focus within the fundal endometrium measures up to 1.2 cm and may


demonstrate minimal internal vascularity. This could be correlated with


hysteroscopy to exclude endometrial mass lesion.


2. Mildly complex cystic structure of the right ovary measuring up to 2.7 cm


suggests involuting hemorrhagic cyst or less likely a small endometrioma.


3. Mild free pelvic fluid, likely reactive.


4. No evidence of ovarian torsion.





The above report was generated using voice recognition software. It may contain


grammatical, syntax or spelling errors.











Electronically signed by:  Pernell Ferraro M.D.


1/26/2018 3:21 PM





Dictated Date/Time:  1/26/2018 3:16 PM





 The status of this report is Signed.   


 Draft = Not yet reviewed or approved by Radiologist.  


 Signed = Reviewed and approved by Radiologist.


<AttendingPhy></AttendingPhy> <FamilyPhy>No Doctor, Assigned</FamilyPhy> <

PrimaryPhy>No Doctor, Assigned</PrimaryPhy> <UnitNumber>[id number]</UnitNumber> 

<VisitNumber>[id number]</VisitNumber> <PatientName>RAZA BOND</PatientName

> <DateOfBirth>1985</DateOfBirth> <Location>[facility]</Location> <ServiceDate>

01/26/18</ServiceDate> <MNE>ESINDI</MNE> <OrderingPhy>Suha oTrres PA-C</

OrderingPhy> <OrderingPhyMNE>f rep ord dr granados</OrderingPhyMNE> <DictatingPhyMNE>

f rep dict dr granados</DictatingPhyMNE> <CCListMNE>f rep ct mne</CCListMNE> <

AdmittingPhyMNE>f pt admit dr granados</AdmittingPhyMNE> <AttendingPhyMNE>f pt 

attend dr granados</AttendingPhyMNE>


<ConsultingPhyMNE>f pt consult dr granados</ConsultingPhyMNE> <FamilyPhyMNE>f pt fam 

dr granados</FamilyPhyMNE> <OtherPhyMNE>f pt other dr granados</OtherPhyMNE> <

PrimaryPhyMNE>f pt prim care dr granados</PrimaryPhyMNE> <ReferringPhyMNE>f pt 

referring dr granados</ReferringPhyMNE>








Pelvic US





                                                                               

                                                                 


Patient Name: RAZA BOND                               Unit Number:  

[id number]                  


 








 











Dictated: 01/26/18 1516


 


Transcribed: 01/26/18 1516


 


JRB


 


Printed Date/Time: [~ rep prt dt]/[~ rep prt tm]








 [~ rep ct labl] - [~ rep ct ivnm]


 





   [facility]


 Radiology Department


 [place] [address] [phone]





 











Dictated: 01/26/18 1516


 


Transcribed: 01/26/18 1516


 


JRB


 


Printed Date/Time: [~ rep prt dt]/[~ rep prt tm]








 [~ rep ct labl] - [~ rep ct ivnm]


 








 IMPRESSION: 


1. Hyperechoic focus within the fundal endometrium measures up to 1.2 cm and may


demonstrate minimal internal vascularity. This could be correlated with


hysteroscopy to exclude endometrial mass lesion.


2. Mildly complex cystic structure of the right ovary measuring up to 2.7 cm


suggests involuting hemorrhagic cyst or less likely a small endometrioma.


3. Mild free pelvic fluid, likely reactive.


4. No evidence of ovarian torsion.





The above report was generated using voice recognition software. It may contain


grammatical, syntax or spelling errors.











Electronically signed by:  Pernell Ferraro M.D.


1/26/2018 3:21 PM





Dictated Date/Time:  1/26/2018 3:16 PM





 The status of this report is Signed.   


 Draft = Not yet reviewed or approved by Radiologist.  


 Signed = Reviewed and approved by Radiologist.


<AttendingPhy></AttendingPhy> <FamilyPhy>No Doctor, Assigned</FamilyPhy> <

PrimaryPhy>No Doctor, Assigned</PrimaryPhy> <UnitNumber>[id number]</UnitNumber> 

<VisitNumber>[id number]</VisitNumber> <PatientName>RAZA BOND</PatientName

> <DateOfBirth>1985</DateOfBirth> <Location>[facility]</Location> <ServiceDate>

01/26/18</ServiceDate> <MNE>ESINDI</MNE> <OrderingPhy>Suha Torres PA-C</

OrderingPhy> <OrderingPhyMNE>f rep ord dr granados</OrderingPhyMNE> <DictatingPhyMNE>

f rep dict dr granados</DictatingPhyMNE> <CCListMNE>f rep ct mne</CCListMNE> <

AdmittingPhyMNE>f pt admit dr granados</AdmittingPhyMNE> <AttendingPhyMNE>f pt 

attend dr granados</AttendingPhyMNE>


<ConsultingPhyMNE>f pt consult dr granados</ConsultingPhyMNE> <FamilyPhyMNE>f pt fam 

dr granados</FamilyPhyMNE> <OtherPhyMNE>f pt other dr granados</OtherPhyMNE> <

PrimaryPhyMNE>f pt prim care dr granados</PrimaryPhyMNE> <ReferringPhyMNE>f pt 

referring dr granados</ReferringPhyMNE>





Laboratory Results


1/26/18 12:40








Red Blood Count 4.77, Mean Corpuscular Volume 90.4, Mean Corpuscular Hemoglobin 

30.4, Mean Corpuscular Hemoglobin Concent 33.6, Mean Platelet Volume 10.0, 

Neutrophils (%) (Auto) 43.1, Lymphocytes (%) (Auto) 40.9, Monocytes (%) (Auto) 

11.1, Eosinophils (%) (Auto) 4.4, Basophils (%) (Auto) 0.4, Neutrophils # (Auto

) 2.91, Lymphocytes # (Auto) 2.77, Monocytes # (Auto) 0.75, Eosinophils # (Auto

) 0.30, Basophils # (Auto) 0.03





1/26/18 12:40

















Test


  1/26/18


12:40 1/26/18


12:58


 


White Blood Count


  6.77 K/uL


(4.8-10.8) 


 


 


Red Blood Count


  4.77 M/uL


(4.2-5.4) 


 


 


Hemoglobin


  14.5 g/dL


(12.0-16.0) 


 


 


Hematocrit 43.1 % (37-47)  


 


Mean Corpuscular Volume


  90.4 fL


() 


 


 


Mean Corpuscular Hemoglobin


  30.4 pg


(25-34) 


 


 


Mean Corpuscular Hemoglobin


Concent 33.6 g/dl


(32-36) 


 


 


Platelet Count


  245 K/uL


(130-400) 


 


 


Mean Platelet Volume


  10.0 fL


(7.4-10.4) 


 


 


Neutrophils (%) (Auto) 43.1 %  


 


Lymphocytes (%) (Auto) 40.9 %  


 


Monocytes (%) (Auto) 11.1 %  


 


Eosinophils (%) (Auto) 4.4 %  


 


Basophils (%) (Auto) 0.4 %  


 


Neutrophils # (Auto)


  2.91 K/uL


(1.4-6.5) 


 


 


Lymphocytes # (Auto)


  2.77 K/uL


(1.2-3.4) 


 


 


Monocytes # (Auto)


  0.75 K/uL


(0.11-0.59) 


 


 


Eosinophils # (Auto)


  0.30 K/uL


(0-0.5) 


 


 


Basophils # (Auto)


  0.03 K/uL


(0-0.2) 


 


 


RDW Standard Deviation


  42.5 fL


(36.4-46.3) 


 


 


RDW Coefficient of Variation


  12.9 %


(11.5-14.5) 


 


 


Immature Granulocyte % (Auto) 0.1 %  


 


Immature Granulocyte # (Auto)


  0.01 K/uL


(0.00-0.02) 


 


 


Anion Gap


  4.0 mmol/L


(3-11) 


 


 


Est Creatinine Clear Calc


Drug Dose 115.7 ml/min 


  


 


 


Estimated GFR (


American) 138.8 


  


 


 


Estimated GFR (Non-


American 119.7 


  


 


 


BUN/Creatinine Ratio 17.7 (10-20)  


 


Calcium Level


  8.9 mg/dl


(8.5-10.1) 


 


 


Total Bilirubin


  0.5 mg/dl


(0.2-1) 


 


 


Aspartate Amino Transf


(AST/SGOT) 14 U/L (15-37) 


  


 


 


Alanine Aminotransferase


(ALT/SGPT) 22 U/L (12-78) 


  


 


 


Alkaline Phosphatase


  115 U/L


() 


 


 


Total Protein


  8.5 gm/dl


(6.4-8.2) 


 


 


Albumin


  4.1 gm/dl


(3.4-5.0) 


 


 


Globulin


  4.4 gm/dl


(2.5-4.0) 


 


 


Albumin/Globulin Ratio 0.9 (0.9-2)  


 


Human Chorionic Gonadotropin,


Qual NEG (NEG) 


  


 


 


Urine Color  YELLOW 


 


Urine Appearance  CLEAR (CLEAR) 


 


Urine pH  7.0 (4.5-7.5) 


 


Urine Specific Gravity


  


  1.013


(1.000-1.030)


 


Urine Protein  NEG (NEG) 


 


Urine Glucose (UA)  NEG (NEG) 


 


Urine Ketones  NEG (NEG) 


 


Urine Occult Blood  NEG (NEG) 


 


Urine Nitrite  NEG (NEG) 


 


Urine Bilirubin  NEG (NEG) 


 


Urine Urobilinogen  NEG (NEG) 


 


Urine Leukocyte Esterase  TRACE (NEG) 


 


Urine WBC (Auto)  1-5 /hpf (0-5) 


 


Urine RBC (Auto)  0-4 /hpf (0-4) 


 


Urine Hyaline Casts (Auto)  1-5 /lpf (0-5) 


 


Urine Epithelial Cells (Auto)  >30 /lpf (0-5) 


 


Urine Bacteria (Auto)  NEG (NEG) 











ED Course


Patient was seen and examined


Vital signs including  blood pressure were reviewed


medications list was verified with patient


Labs were obtained, and a saline lock was established


The patient declined pain medication


Imaging was performed and reviewed


Upon reassessment, the patient was resting comfortably.  We discussed the 

results of her workup.  She voiced understanding.


I reviewed discharge instructions the patient.  They voiced understanding and 

had no further questions.





Medical Decision


Differential diagnosis: Ectopic pregnancy, ovarian cyst, UTI, pyelonephritis, 

kidney stone, diverticulitis





Patient is a 32-year-old female that presents to the emergency department 

complaining of right lower abdominal pain radiating to her back for 1 week.  

Associated symptoms include urinary frequency.  On exam, the patient had some 

tenderness in the right lower quadrant.  She was nontoxic in appearance.  Her 

workup reveals no leukocytosis.  Her ultrasound was consistent with a right 

sided ovarian cyst.  No signs of torsion.  This is likely the source of her 

pain.  There was also a small mass noted in the endometrium.  The patient was 

informed of this, and I urged her to follow-up with her OB/GYN doctor.  She 

voiced understanding, was comfortable being discharged home.  She declined pain 

medication.  She will return to the emergency department with any new, 

worsening or concerning symptoms.





This chart was completed in part utilizing Dragon Speech Voice Recognition 

software. Attempts were made to minimize the grammatical errors, random word 

insertions, pronoun errors and incomplete sentences. Any formal questions or 

concerns about the content, text or information contained within the body of 

this dictation should be directly addressed to the provider for clarification.





Impression





 Primary Impression:  


 Right ovarian cyst





Departure Information


Dispostion


Home / Self-Care





Condition


GOOD





Referrals


No Doctor, Assigned (PCP)





Patient Instructions


ED Cyst Ovarian, [facility]





Additional Instructions





You have been evaluated in the emergency department for abdominal pain.  This 

is likely due to a right-sided ovarian cyst.  There was also a small mass found 

in the uterus.





It is very important to follow up with your OB/GYN doctor.  Please call first 

thing Monday morning for a follow-up appointment.





Ibuprofen 800 mg and/or Tylenol 1000 mg every 8 hours as needed for pain


You may also alternate these medications for more effective pain relief:


Ibuprofen --4 HRS--> Tylenol --4 HRS--> ibuprofen --4 HRS--> Tylenol ....





Please do not hesitate to return to the emergency department with any new, 

worsening or concerning symptoms

## 2018-01-26 NOTE — DIAGNOSTIC IMAGING REPORT
ULTRASOUND KIDNEYS AND BLADDER



CLINICAL HISTORY: Right flank pain.



COMPARISON STUDY: Renal ultrasound dated 4/19/2017.



TECHNIQUE: Real-time, grayscale, and color flow sonography of the kidneys and

bladder is performed. Images are reviewed in the transverse and longitudinal

planes.



FINDINGS:



Kidneys: The kidneys are normal in size and echotexture. The right kidney

measures 10.3 cm in length and the left kidney measures 10.6 cm in length. 

There is no hydronephrosis. No shadowing renal calculi are identified. There is

no sonographic evidence of contour deforming renal mass lesion. No perinephric

fluid is identified.



Bladder: The bladder is partially decompressed and grossly normal in appearance.

Ureteral jets were not seen.



IMPRESSION: 



1. The kidneys are normal in size and without hydronephrosis.



2. The bladder was partially decompressed and grossly unremarkable.







Electronically signed by:  Uriel Baugh M.D.

1/26/2018 3:17 PM



Dictated Date/Time:  1/26/2018 3:16 PM

## 2018-01-26 NOTE — DIAGNOSTIC IMAGING REPORT
TRANSVAG-FEMALE PELVIS



HISTORY:  32 years-old Female ? cyst RLQ pain radiating to back  acute right

lower quadrant abdominal pain



COMPARISON: Pelvic ultrasound 4/19/2017



TECHNIQUE: Multiple real-time sonogram images of the deep pelvic structures were

obtained transabdominally and transvaginally assessing grayscale appearance,

color and spectral flow



FINDINGS: 



TRANSABDOMINAL:

Anteflexed uterus measures 8.3 x 4.6 x 6.0 cm. Endometrium measures 1.1 cm.

Urinary bladder appears partially collapsed. There is a cystic lesion within the

right ovary. Mildly complex measuring 2.7 x 2.6 x 2.0 cm. Arterial inflow to the

right ovary is confirmed.



TRANSVAGINAL:

Hyperechoic focus with ill-defined margins seen involving the endometrium near

the uterine fundus nicely seen on image 21 which measures 1.2 x 0.6 cm with

possible internal flow. Endometrium measures approximately 7 mm in thickness.

Mild free pelvic fluid.



Right ovary measures 3.5 x 3.6 x 2.6 cm and demonstrates arterial inflow.

Thick-walled cystic structure of the right ovary measures up to 2.7 x 2.0 x 2.6

cm demonstrating mild complexity with internal low-level echoes.



Left ovary measures 3.4 x 2.3 x 2.4 cm and is unremarkable with arterial inflow

documented. No left adnexal mass lesions.



 IMPRESSION: 

1. Hyperechoic focus within the fundal endometrium measures up to 1.2 cm and may

demonstrate minimal internal vascularity. This could be correlated with

hysteroscopy to exclude endometrial mass lesion.

2. Mildly complex cystic structure of the right ovary measuring up to 2.7 cm

suggests involuting hemorrhagic cyst or less likely a small endometrioma.

3. Mild free pelvic fluid, likely reactive.

4. No evidence of ovarian torsion.



The above report was generated using voice recognition software. It may contain

grammatical, syntax or spelling errors.







Electronically signed by:  Pernell Ferraro M.D.

1/26/2018 3:21 PM



Dictated Date/Time:  1/26/2018 3:16 PM